# Patient Record
(demographics unavailable — no encounter records)

---

## 2025-04-16 NOTE — BIRTH HISTORY
[At Term] : at term [place] [Normal Vaginal Route] : by normal vaginal route [None] : there were no delivery complications [] : There were no problems passing meconium within 24 - 48 hrs of life

## 2025-04-16 NOTE — REASON FOR VISIT
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